# Patient Record
Sex: MALE | Race: BLACK OR AFRICAN AMERICAN | NOT HISPANIC OR LATINO
[De-identification: names, ages, dates, MRNs, and addresses within clinical notes are randomized per-mention and may not be internally consistent; named-entity substitution may affect disease eponyms.]

---
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
## 2023-01-30 NOTE — COUNSELING
Patient would like to  Get refills onhTerazone 1 mg  PLease send to [facility] [address]    [Use of Plain Language] : use of plain language